# Patient Record
Sex: MALE | Race: WHITE | NOT HISPANIC OR LATINO | URBAN - METROPOLITAN AREA
[De-identification: names, ages, dates, MRNs, and addresses within clinical notes are randomized per-mention and may not be internally consistent; named-entity substitution may affect disease eponyms.]

---
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
## 2018-12-20 ENCOUNTER — OFFICE VISIT - RIVER FALLS (OUTPATIENT)
Dept: FAMILY MEDICINE | Facility: CLINIC | Age: 18
End: 2018-12-20

## 2018-12-20 ASSESSMENT — MIFFLIN-ST. JEOR: SCORE: 1712.95

## 2019-12-23 ENCOUNTER — OFFICE VISIT - RIVER FALLS (OUTPATIENT)
Dept: FAMILY MEDICINE | Facility: CLINIC | Age: 19
End: 2019-12-23

## 2019-12-23 ASSESSMENT — MIFFLIN-ST. JEOR: SCORE: 1689.36

## 2022-02-11 VITALS
SYSTOLIC BLOOD PRESSURE: 112 MMHG | WEIGHT: 160.4 LBS | HEIGHT: 69 IN | DIASTOLIC BLOOD PRESSURE: 64 MMHG | HEART RATE: 62 BPM | BODY MASS INDEX: 23.76 KG/M2

## 2022-02-11 VITALS
DIASTOLIC BLOOD PRESSURE: 60 MMHG | SYSTOLIC BLOOD PRESSURE: 118 MMHG | BODY MASS INDEX: 22.99 KG/M2 | HEART RATE: 72 BPM | HEIGHT: 69 IN | TEMPERATURE: 97.4 F | WEIGHT: 155.2 LBS

## 2022-02-16 NOTE — PROGRESS NOTES
Patient:   NAREN BOBBY            MRN: [id number]            FIN: [id number]               Age:   18 years     Sex:  Male     :  2000   Associated Diagnoses:   Tendinitis   Author:   Evelio Tenorio MD      Chief Complaint   2018 9:42 AM CST   c/o right ankle pain. Sprained it 2 months ago.      History of Present Illness             The patient presents with ankle sprain.  The location of the ankle sprain is the right and ankle.  The ankle sprain is characterized by pain.  The severity of the ankle sprain is moderate.  The ankle sprain occurred 2 week(s) ago.  The context of the ankle sprain: occurred during sports.  Prior treatment consists of bracing.  His  told him that he sprained it and had a bone contusion. He did not have any x-rays done at that time. Has been doing a few ankle stretches..        Review of Systems   Constitutional:  Weakness, No fever, No chills, No fatigue.    Respiratory:  No shortness of breath, No cough.    Cardiovascular:  No chest pain.    Gastrointestinal:  No nausea, No vomiting.    Musculoskeletal:  Right ankle pain, No back pain.    Integumentary:  No rash.    Neurologic:  No headache.    Psychiatric:  Negative.       Health Status   Allergies:    Allergic Reactions (Selected)  No known allergies   Medications:  (Selected)      Problem list:    No problem items selected or recorded.      Physical Examination   Vital Signs   2018 9:42 AM CST Peripheral Pulse Rate 62 bpm    Systolic Blood Pressure 112 mmHg    Diastolic Blood Pressure 64 mmHg    Mean Arterial Pressure 80 mmHg      Measurements from flowsheet : Measurements   2018 9:42 AM CST Height Measured - Standard 69 in    Weight Measured - Standard 160.4 lb    BSA 1.88 m2    Body Mass Index 23.68 kg/m2      General:  Alert and oriented, No acute distress.    Eye:  Pupils are equal, round and reactive to light, Normal conjunctiva.    HENT:  Oral mucosa is moist.    Neck:  Supple.    Respiratory:   Respirations are non-labored.    Cardiovascular:  Normal rate, Regular rhythm.    Musculoskeletal:  area of tenderness corresponds to peroneal muscle .    Psychiatric:  Cooperative, Appropriate mood & affect, Normal judgment.       Impression and Plan   Diagnosis     Tendinitis ([id number] M77.9).     Plan:  No evidence of fracture was identified on the x-ray.  Symptoms and exam are consistent with an ankle tendonitis.  You will be contacted if any x-ray abnormality is noted by the radiologist.  Follow up if no significant improvement in 1-2 weeks . Discussed seeing PT and wearing his brace. He declines PT but is given exercises to try at home. Dicussed to ice as needed.         .      I, Yajaira [facility], acted solely as a scribe for, and in the presence of Dr. Evelio Tenorio who performed the service..

## 2022-02-16 NOTE — NURSING NOTE
Comprehensive Intake Entered On:  12/23/2019 10:00 AM CST    Performed On:  12/23/2019 9:56 AM CST by Ginny Lyn CMA               Summary   Chief Complaint :   c/o left shoulder pain for the past year, pain getting worse   Menstrual Status :   N/A   Weight Measured :   155.2 lb(Converted to: 155 lb 3 oz, 70.40 kg)    Height Measured :   69 in(Converted to: 5 ft 9 in, 175.26 cm)    Body Mass Index :   22.92 kg/m2   Body Surface Area :   1.85 m2   Systolic Blood Pressure :   118 mmHg   Diastolic Blood Pressure :   60 mmHg   Mean Arterial Pressure :   79 mmHg   Peripheral Pulse Rate :   72 bpm   BP Site :   Right arm   Pulse Site :   Radial artery   BP Method :   Manual   HR Method :   Manual   Temperature Tympanic :   97.4 DegF(Converted to: 36.3 DegC)  (LOW)    Ginny Lyn CMA - 12/23/2019 9:56 AM CST   Health Status   Allergies Verified? :   Yes   Medication History Verified? :   Yes   Medical History Verified? :   No   Pre-Visit Planning Status :   Not completed   Tobacco Use? :   Never smoker   Ginny Lyn CMA - 12/23/2019 9:56 AM CST   Consents   Consent for Immunization Exchange :   Consent Granted   Consent for Immunizations to Providers :   Consent Granted   Ginny Lyn CMA - 12/23/2019 9:56 AM CST   Meds / Allergies   (As Of: 12/23/2019 10:00:03 AM CST)   Allergies (Active)   No known allergies  Estimated Onset Date:   Unspecified ; Created By:   Tenisha Ramírez; Reaction Status:   Active ; Category:   Drug ; Substance:   No known allergies ; Type:   Allergy ; Updated By:   Tenisha Ramírez; Reviewed Date:   12/23/2019 9:58 AM CST        Medication List   (As Of: 12/23/2019 10:00:03 AM CST)

## 2022-02-16 NOTE — PROGRESS NOTES
Chief Complaint    c/o left shoulder pain for the past year, pain getting worse  History of Present Illness      Pain in left shoulder 12-18 months. Uncomfortable laying in bed and sitting. Painful when lifting. Does not notice the pain when moving around and walking. Pain started gradually with no known trauma or repetitive motion. Still lifting weights.      Pain is in the upper left back and side of the left neck.  He is able to point to a specific spot.  It does not wake him up at night.  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 Will be working at [employer] as   Physical Exam   Vitals & Measurements    T: 97.4   F (Tympanic)  HR: 72(Peripheral)  BP: 118/60     HT: 69 in  WT: 155.2 lb  BMI: 22.92       General: No acute distress      Musculoskeletal: Normal gait. good strength and range of motion of the left arm.  Rotator cuff muscles are intact.  Negative Leigh and Neer's test.      Skin: No bruising or swelling      Pain is in the upper left back and left posterior sternocleidomastoid.      No alarm symptoms  Assessment/Plan      Upper back pain: Most likely musculoskeletal.  Refer to physical therapy and follow-up if not improving over the next 1 to 2 months  Patient Information     Name:NAREN BOBBY      Address:      [address] [place] [id number]     Sex:Male     YOB: 2000     Phone:[phone]     Emergency Contact:KAIN BOBBY     MRN:[id number]     FIN:[id number]     Location:[facility]     Date of Service:12/23/2019      Primary Care Physician:       Phill Mazariegos MD, [phone]      Attending Physician:       Phill Ramirez MD, [phone]  Problem List/Past Medical History    Ongoing     No chronic problems    Historical     No qualifying data  Medications   No active medications  Allergies    No known allergies  Social  History    Smoking Status - 12/23/2019     Never smoker